# Patient Record
Sex: MALE | Race: WHITE | Employment: OTHER | URBAN - METROPOLITAN AREA
[De-identification: names, ages, dates, MRNs, and addresses within clinical notes are randomized per-mention and may not be internally consistent; named-entity substitution may affect disease eponyms.]

---

## 2021-03-06 ENCOUNTER — IMMUNIZATION (OUTPATIENT)
Dept: PRIMARY CARE CLINIC | Age: 76
End: 2021-03-06
Payer: MEDICARE

## 2021-03-06 PROCEDURE — 91300 COVID-19, PFIZER VACCINE 30MCG/0.3ML DOSE: CPT | Performed by: PHYSICIAN ASSISTANT

## 2021-03-06 PROCEDURE — 0001A COVID-19, PFIZER VACCINE 30MCG/0.3ML DOSE: CPT | Performed by: PHYSICIAN ASSISTANT

## 2021-04-01 ENCOUNTER — IMMUNIZATION (OUTPATIENT)
Dept: PRIMARY CARE CLINIC | Age: 76
End: 2021-04-01
Payer: MEDICARE

## 2021-04-01 PROCEDURE — 91300 COVID-19, PFIZER VACCINE 30MCG/0.3ML DOSE: CPT | Performed by: NURSE PRACTITIONER

## 2021-04-01 PROCEDURE — 0002A COVID-19, PFIZER VACCINE 30MCG/0.3ML DOSE: CPT | Performed by: NURSE PRACTITIONER

## 2021-10-06 ENCOUNTER — OFFICE VISIT (OUTPATIENT)
Dept: CARDIOLOGY CLINIC | Age: 76
End: 2021-10-06
Payer: MEDICARE

## 2021-10-06 VITALS
HEART RATE: 58 BPM | HEIGHT: 67 IN | WEIGHT: 225 LBS | SYSTOLIC BLOOD PRESSURE: 128 MMHG | DIASTOLIC BLOOD PRESSURE: 78 MMHG | RESPIRATION RATE: 12 BRPM | BODY MASS INDEX: 35.31 KG/M2

## 2021-10-06 DIAGNOSIS — R06.02 SOB (SHORTNESS OF BREATH): Primary | ICD-10-CM

## 2021-10-06 PROCEDURE — 93000 ELECTROCARDIOGRAM COMPLETE: CPT | Performed by: INTERNAL MEDICINE

## 2021-10-06 PROCEDURE — 99204 OFFICE O/P NEW MOD 45 MIN: CPT | Performed by: INTERNAL MEDICINE

## 2021-10-06 RX ORDER — AMLODIPINE BESYLATE 5 MG/1
TABLET ORAL
COMMUNITY
Start: 2020-11-03

## 2021-10-06 RX ORDER — ROPINIROLE 0.25 MG/1
0.25 TABLET, FILM COATED ORAL NIGHTLY
COMMUNITY

## 2021-10-06 RX ORDER — ALBUTEROL SULFATE 90 UG/1
AEROSOL, METERED RESPIRATORY (INHALATION)
COMMUNITY
Start: 2020-10-02

## 2021-10-06 NOTE — PROGRESS NOTES
OUTPATIENT CARDIOLOGY CONSULT    Name: Siena Serrano    Age: 70 y.o. Date of Service: 10/6/2021    Reason for Consultation: Shortness of breath    Referring Physician: Andressa Rayn DO    History of Present Illness:  Siena Serrano is a 70 y.o. male who presents today for further evaluation of shortness of breath. Previously evaluated by Dr. Rishi Yeh in inpatient consultation in 2015 for chest pain. Stress test and echocardiogram at that time were unremarkable. 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 His main complaint seems to be of restless legs which bothers him at night. He denies exertional claudication, denies exertional chest pain or shortness of breath. He reports \"chest congestion. \"  He smokes 1/2 pack cigarettes per day. Review of Systems:  Complete review of systems otherwise negative except as described above. Past Medical History:  Past Medical History:   Diagnosis Date    Emphysema lung ([address]     Hypertension     RLS (restless legs syndrome)        Past Surgical History:  Past Surgical History:   Procedure Laterality Date    CATARACT REMOVAL         Family History:  History reviewed. No pertinent family history.     Social History:  Social History     Tobacco Use    Smoking status: Current Every Day Smoker     Packs/day: 1.00     Types: Cigarettes    Smokeless tobacco: Never Used   Substance Use Topics    Alcohol use: No    Drug use: No        Allergies:  No Known Allergies    Current Medications:    Current Outpatient Medications:     amLODIPine (NORVASC) 5 MG tablet, TAKE ONE TABLET BY MOUTH EVERY DAY, Disp: , Rfl:     albuterol sulfate  (90 Base) MCG/ACT inhaler, INHALE 1 OR 2 PUFFS BY MOUTH FOUR TIMES A DAY AS NEEDED FOR SHORTNESS OF BREATH OR WHEEZING., Disp: , Rfl:     rOPINIRole (REQUIP) 0.25 MG tablet, Take 0.25 mg by mouth nightly Taking 3 times per week, Disp: , Rfl:     Multiple Vitamin (MULTI VITAMIN DAILY) TABS, Take by mouth daily, Disp: , Rfl:     pantoprazole (PROTONIX) 40 MG tablet, Take 1 tablet by mouth every morning (before breakfast) (Patient not taking: Reported on 10/6/2021), Disp: 30 tablet, Rfl: 3    Physical Exam:  /78   Pulse 58   Resp 12   Ht 5' 7\" (1.702 m)   Wt 225 lb (102.1 kg)   BMI 35.24 kg/m²   Wt Readings from Last 3 Encounters:   10/06/21 225 lb (102.1 kg)     Appearance: Overweight male, awake, alert, no acute respiratory distress  Skin: Intact, no rash  Eyes: EOMI, no conjunctival erythema  ENMT: Moist mucous membranes. Neck: Supple, no elevated JVP, no carotid bruits  Lungs: Scattered expiratory wheezing  Cardiac: Regular rhythm with a normal rate.   S1 & S2 normal, no murmurs  Abdomen: Soft, nontender, +bowel sounds  Extremities: Moves all extremities x 4, no lower extremity edema  Neurologic: No focal motor deficits apparent, normal mood and affect  Peripheral Pulses: Intact posterior tibial pulses bilaterally    Laboratory Tests:  Lab Results   Component Value Date    CREATININE 0.9 08/12/2015    BUN 18 08/12/2015     08/12/2015    K 4.2 08/12/2015    CL 99 08/12/2015    CO2 25 08/12/2015     No results found for: MG  Lab Results   Component Value Date    WBC 13.1 (H) 08/12/2015    HGB 15.6 08/12/2015    HCT 47.7 08/12/2015    MCV 88.7 08/12/2015     08/12/2015     No results found for: ALT, AST, GGT, ALKPHOS, BILITOT  Lab Results   Component Value Date    CKTOTAL 57 08/12/2015    CKMB 1.3 08/12/2015    TROPONINI <0.01 08/12/2015    TROPONINI <0.01 08/12/2015     Lab Results   Component Value Date    INR 1.0 08/12/2015    PROTIME 11.1 08/12/2015     No results found for: TSHFT4, TSH  No results found for: LABA1C  No results found for: EAG  Lab Results   Component Value Date    CHOL 178 08/12/2015     Lab Results   Component Value Date    TRIG 117 08/12/2015     Lab Results   Component Value Date    HDL 34 08/12/2015     Lab Results   Component Value Date LDLCALC 121 (H) 08/12/2015     Lab Results   Component Value Date    LABVLDL 23 08/12/2015     No results found for: CHOLHDLRATIO  No results for input(s): PROBNP in the last 72 hours. Blood work 9/14/2021  Total cholesterol 203, HDL 36, triglycerides 163,   BUN 19 creatinine 1.1  Sodium 137 potassium 4.1  AST ALT normal  Hemoglobin 15.5 platelets 996    Cardiac Tests:  ECG:   10/6/2021: Sinus bradycardia 58 bpm.  Normal axis. Nonspecific IVCD QRS duration 118 ms. Prior inferior infarct. Nonspecific T wave changes. Echocardiogram:   Transthoracic echo 8/2015   Summary   Stage I diastolic dysfunction   Ejection fraction is visually estimated at >56%. The left atrium is severely dilated. Structurally normal mitral valve. No aortic stenosis   No evidence of pericardial effusion. Stress test:    Pharmacologic stress 8/2015      Gated study shows normal left   ventricular wall motion and myocardial thickening during systole. Resting left ventricular ejection fraction 67%           Impression   IMPRESSION:  Normal examination.  In particular, there is no   evidence of left ventricular myocardium at risk for stress-induced   ischemia. Cardiac catheterization:     Orders Placed This Encounter   Procedures    EKG 12 lead    ECHO COMPLETE        Requested Prescriptions      No prescriptions requested or ordered in this encounter        ASSESSMENT / PLAN:  1. Dyspnea with exertion  2. Abnormal EKG, suggesting prior inferior infarct, similar to 2015. No infarct noted on stress testing  3. Hypertension, well controlled  4. Tobacco abuse ongoing  5. COPD/emphysema  6. Obesity, BMI 35.2 kg/m²  7.  Restless leg    Recommendations:    · Repeat 2D echocardiogram  · Does not seem to be having any exertional anginal symptoms, can hold off on stress testing for now  · Increase physical activity, weight loss recommended  · Aggressive risk factor modification including smoking cessation recommended  · Consider pulmonary evaluation for formal PFTs and optimization of inhaled therapies  · Consider sleep study  · Further recommendation pending review of echo    Thank you for allowing me to participate in your patient's care. Please feel free to contact me if you have any questions or concerns.       Araceli Jimenez MD, [facility] Card[facility]

## 2022-01-28 ENCOUNTER — TELEPHONE (OUTPATIENT)
Dept: CARDIOLOGY | Age: 77
End: 2022-01-28

## 2022-01-28 NOTE — TELEPHONE ENCOUNTER
CALLED PATIENT AND LEFT MESSAGE TO SCHEDULE ECHO.     Electronically signed by Emani Wahl on 1/28/2022 at 12:17 PM

## 2022-05-04 ENCOUNTER — HOSPITAL ENCOUNTER (OUTPATIENT)
Dept: CT IMAGING | Age: 77
Discharge: HOME | End: 2022-05-06
Payer: MEDICARE

## 2022-05-04 DIAGNOSIS — R06.02 SHORTNESS OF BREATH: ICD-10-CM

## 2022-05-04 PROCEDURE — 71250 CT THORAX DX C-: CPT

## 2024-03-21 ENCOUNTER — TELEPHONE (OUTPATIENT)
Dept: ENT CLINIC | Age: 79
End: 2024-03-21

## 2024-03-21 NOTE — TELEPHONE ENCOUNTER
b/l sensorineural hearing loss     [facility] patient [facility] referral. Patient requesting to be seen sooner than July with Dr Poncho vasques or deng April or beginning May . Please advise patient [phone]

## 2024-03-25 NOTE — TELEPHONE ENCOUNTER
Patient called back in regards to current symptoms patient went to [facility], has hearing loss [facility] test was [facility] may have impacted cerumen.

## 2024-03-27 ENCOUNTER — APPOINTMENT (OUTPATIENT)
Dept: ULTRASOUND IMAGING | Age: 79
End: 2024-03-27
Payer: OTHER GOVERNMENT

## 2024-03-27 ENCOUNTER — HOSPITAL ENCOUNTER (EMERGENCY)
Age: 79
Discharge: HOME | End: 2024-03-27
Payer: OTHER GOVERNMENT

## 2024-03-27 ENCOUNTER — APPOINTMENT (OUTPATIENT)
Dept: GENERAL RADIOLOGY | Age: 79
End: 2024-03-27
Payer: OTHER GOVERNMENT

## 2024-03-27 VITALS
HEART RATE: 64 BPM | HEIGHT: 67 IN | OXYGEN SATURATION: 95 % | SYSTOLIC BLOOD PRESSURE: 100 MMHG | WEIGHT: 216 LBS | RESPIRATION RATE: 16 BRPM | TEMPERATURE: 97.9 F | BODY MASS INDEX: 33.9 KG/M2 | DIASTOLIC BLOOD PRESSURE: 83 MMHG

## 2024-03-27 DIAGNOSIS — L03.032 CELLULITIS OF TOE OF LEFT FOOT: ICD-10-CM

## 2024-03-27 DIAGNOSIS — L03.032 PARONYCHIA OF GREAT TOE OF LEFT FOOT: Primary | ICD-10-CM

## 2024-03-27 PROCEDURE — 96372 THER/PROPH/DIAG INJ SC/IM: CPT

## 2024-03-27 PROCEDURE — 6370000000 HC RX 637 (ALT 250 FOR IP)

## 2024-03-27 PROCEDURE — 6360000002 HC RX W HCPCS

## 2024-03-27 PROCEDURE — 73610 X-RAY EXAM OF ANKLE: CPT

## 2024-03-27 PROCEDURE — 73630 X-RAY EXAM OF FOOT: CPT

## 2024-03-27 PROCEDURE — 99284 EMERGENCY DEPT VISIT MOD MDM: CPT

## 2024-03-27 PROCEDURE — 93971 EXTREMITY STUDY: CPT

## 2024-03-27 PROCEDURE — 10060 I&D ABSCESS SIMPLE/SINGLE: CPT

## 2024-03-27 RX ORDER — IBUPROFEN 600 MG/1
600 TABLET ORAL 3 TIMES DAILY PRN
Qty: 21 TABLET | Refills: 0 | Status: SHIPPED | OUTPATIENT
Start: 2024-03-27 | End: 2024-04-03

## 2024-03-27 RX ORDER — BACITRACIN ZINC AND POLYMYXIN B SULFATE 500; 1000 [USP'U]/G; [USP'U]/G
OINTMENT TOPICAL
Qty: 28.4 G | Refills: 1 | Status: SHIPPED | OUTPATIENT
Start: 2024-03-27 | End: 2024-04-03

## 2024-03-27 RX ORDER — CEPHALEXIN 500 MG/1
500 CAPSULE ORAL ONCE
Status: DISCONTINUED | OUTPATIENT
Start: 2024-03-27 | End: 2024-03-28 | Stop reason: HOSPADM

## 2024-03-27 RX ORDER — KETOROLAC TROMETHAMINE 30 MG/ML
30 INJECTION, SOLUTION INTRAMUSCULAR; INTRAVENOUS ONCE
Status: COMPLETED | OUTPATIENT
Start: 2024-03-27 | End: 2024-03-27

## 2024-03-27 RX ORDER — CEPHALEXIN 500 MG/1
500 CAPSULE ORAL 4 TIMES DAILY
Qty: 28 CAPSULE | Refills: 0 | Status: SHIPPED | OUTPATIENT
Start: 2024-03-27 | End: 2024-04-03

## 2024-03-27 RX ORDER — ETHYL CHLORIDE 100 %
AEROSOL, SPRAY (ML) TOPICAL ONCE
Status: COMPLETED | OUTPATIENT
Start: 2024-03-27 | End: 2024-03-27

## 2024-03-27 RX ORDER — GINSENG 100 MG
CAPSULE ORAL ONCE
Status: COMPLETED | OUTPATIENT
Start: 2024-03-27 | End: 2024-03-27

## 2024-03-27 RX ADMIN — KETOROLAC TROMETHAMINE 30 MG: 30 INJECTION, SOLUTION INTRAMUSCULAR at 22:11

## 2024-03-27 RX ADMIN — BACITRACIN: 500 OINTMENT TOPICAL at 22:11

## 2024-03-27 RX ADMIN — Medication: at 21:57

## 2024-03-27 ASSESSMENT — PAIN - FUNCTIONAL ASSESSMENT: PAIN_FUNCTIONAL_ASSESSMENT: 0-10

## 2024-03-27 ASSESSMENT — LIFESTYLE VARIABLES
HOW OFTEN DO YOU HAVE A DRINK CONTAINING ALCOHOL: NEVER
HOW MANY STANDARD DRINKS CONTAINING ALCOHOL DO YOU HAVE ON A TYPICAL DAY: PATIENT DOES NOT DRINK

## 2024-03-27 ASSESSMENT — PAIN SCALES - GENERAL: PAINLEVEL_OUTOF10: 6

## 2024-03-27 NOTE — ED TRIAGE NOTES
Department of Emergency Medicine    FIRST PROVIDER TRIAGE NOTE             Independent MLP           3/27/24  5:42 PM EDT    Date of Encounter: 3/27/24   MRN: [id number]    Vitals:    03/27/24 1726   BP: 100/83   Pulse: 64   Resp: 16   Temp: 97.9 °F (36.6 °C)   SpO2: 95%   Weight: 98 kg (216 lb)   Height: 1.702 m (5' 7\")        HPI: Edinson Ricketts is a 78 y.o. male who presents to the ED for Joint Swelling (Left foot and ankle swelling, warm and red x4 days)     ROS: Negative for cp, sob, or fever.    Physical Exam:   Gen Appearance/Constitutional: alert  CV: regular rate     Initial Plan of Care: All treatment areas with department are currently occupied.     Plan to order/Initiate the following while awaiting opening in ED: Triage evaluation  imaging studies.    Initial Plan of Care: Initiate Treatment-Testing, Proceed toTreatment Area When Bed Available for ED Attending/MLP to Continue Care    Electronically signed by Graciela Marion PA-C   DD: 3/27/24

## 2024-03-28 NOTE — DISCHARGE INSTRUCTIONS
Vascular duplex lower extremity venous left   Final Result   No evidence of DVT in the left lower extremity.         XR FOOT LEFT (MIN 3 VIEWS)   Final Result   No acute osseous abnormality.      There is diffuse soft tissue swelling.         XR ANKLE LEFT (MIN 3 VIEWS)   Final Result   No acute abnormality of the ankle.      Diffuse soft tissue swelling.         Please call Dr. Vázquez and schedule an appointment for follow-up. There was some pus that came out of the skin beneath your toenail on your big toe (left foot). I am going to treat you for an infection in your big toe called paronychia. You need to take all doses of your antibiotic as prescribed. Do not miss any doses! Also apply antibiotic ointment 2 times per day to this toe. Please keep this toe clean and dry. It may also help to soak your foot in warm, soapy water.  If you develop worsening pain or infection looks worse, you need to return to the emergency department for reevaluation. If you develop fevers, body aches, or chills, you should also return to the emergency department.

## 2024-03-28 NOTE — ED PROVIDER NOTES
fracture, dislocation, gout, dependent edema    Patient is a 78 presents emergency department complaining of nontraumatic left great toe pain.  He is also having some swelling to left ankle.  There is swelling and erythema noted to left great toe, as well as fluctuance noted below base of toenail.  I am highly suspicious for paronychia.  Incision and drainage was performed, small amount of pus and blood was drained from paronychia.  X-ray of left foot and ankle shows no acute abnormality, does show diffuse soft tissue swelling.  Ultrasound of left lower extremity shows no evidence for DVT.  Vascular duplex lower extremity venous left   Final Result   No evidence of DVT in the left lower extremity.         XR FOOT LEFT (MIN 3 VIEWS)   Final Result   No acute osseous abnormality.      There is diffuse soft tissue swelling.         XR ANKLE LEFT (MIN 3 VIEWS)   Final Result   No acute abnormality of the ankle.      Diffuse soft tissue swelling.         Toradol was given while in the emergency department for pain control.  Bacitracin ointment was applied to paronychia after I&D was performed.  Keflex, ibuprofen, Polysporin was sent to patient's pharmacy for pickup.  He is instructed to take entire course of Keflex, not to miss any doses.  Instructed to apply Polysporin ointment to affected digit 2 times daily.  Instructed to keep area clean and dry.  I specifically explained to patient that if he notices any worsening pain, worsening swelling and redness, drainage, fevers, he needs to return to the emergency department for reevaluation.  I instructed patient to call his primary care doctor tomorrow morning to schedule appointment for follow-up visit to ensure improvement of paronychia.  Patient understands all discharge education and is agreeable with plan.  All questions and concerns addressed prior to discharge.  Patient stable for discharge home.    Plan of Care/Counseling:  MIKE Dalton - JEROME reviewed

## 2024-04-10 ENCOUNTER — OFFICE VISIT (OUTPATIENT)
Dept: ENT CLINIC | Age: 79
End: 2024-04-10
Payer: MEDICARE

## 2024-04-10 VITALS
DIASTOLIC BLOOD PRESSURE: 72 MMHG | HEART RATE: 63 BPM | BODY MASS INDEX: 34.21 KG/M2 | HEIGHT: 67 IN | OXYGEN SATURATION: 93 % | WEIGHT: 218 LBS | SYSTOLIC BLOOD PRESSURE: 131 MMHG

## 2024-04-10 DIAGNOSIS — H61.23 BILATERAL IMPACTED CERUMEN: Primary | ICD-10-CM

## 2024-04-10 PROCEDURE — 99203 OFFICE O/P NEW LOW 30 MIN: CPT | Performed by: OTOLARYNGOLOGY

## 2024-04-10 PROCEDURE — 1123F ACP DISCUSS/DSCN MKR DOCD: CPT | Performed by: OTOLARYNGOLOGY

## 2024-04-10 PROCEDURE — 69210 REMOVE IMPACTED EAR WAX UNI: CPT | Performed by: OTOLARYNGOLOGY

## 2024-04-10 RX ORDER — DOXYCYCLINE HYCLATE 100 MG/1
100 CAPSULE ORAL 2 TIMES DAILY
COMMUNITY
Start: 2024-04-03

## 2024-04-10 NOTE — PROGRESS NOTES
HENT:      Head: Normocephalic and atraumatic.      Right Ear: Ear canal and external ear normal. There is impacted cerumen.      Left Ear: Ear canal and external ear normal. There is impacted cerumen.      Nose: Nose normal.      Mouth/Throat:      Pharynx: Uvula midline.   Eyes:      Conjunctiva/sclera: Conjunctivae normal.      Pupils: Pupils are equal, round, and reactive to light.   Cardiovascular:      Rate and Rhythm: Normal rate and regular rhythm.      Heart sounds: Normal heart sounds.   Pulmonary:      Effort: Pulmonary effort is normal.      Breath sounds: Normal breath sounds.   Abdominal:      General: Bowel sounds are normal.      Palpations: Abdomen is soft.   Musculoskeletal:      Cervical back: Normal range of motion and neck supple.   Skin:     General: Skin is warm and dry.   Neurological:      Mental Status: He is alert and oriented to person, place, and time.               Cerumenremoval     Auditory canal(s) both ears partially obstructed with cerumen.  A microscope wasnot used . Cerumen was gently removed using soft plastic curette, suction. Tympanic membranes are intact following the procedure.Auditory canals appear normal.                       Assessment:       Diagnosis Orders   1. Bilateral impacted cerumen [H61.23]                   Plan:      Cerumen impaction   Will follow up with patient in 6 months unless the patient has further issues. Discussed H2O2 andirrigation bi-weekly for maintenance      Follow up as scheduled                      Edinson Ricketts  1945    I have discussed the case, including pertinent history and exam findings with the resident. I have seen and examined the patient and the key elements of the encounter have been performed by me. I agree with the assessment, plan and orders as documented by the  resident              Remainder of medical problems as per  resident note.    Patient seen and examined. Agree with above exam, assessment and

## 2024-05-06 ASSESSMENT — ENCOUNTER SYMPTOMS
CHEST TIGHTNESS: 0
RESPIRATORY NEGATIVE: 1
EYES NEGATIVE: 1
EYE PAIN: 0
VOMITING: 0
SHORTNESS OF BREATH: 0
ABDOMINAL PAIN: 0
EYE DISCHARGE: 0
GASTROINTESTINAL NEGATIVE: 1
DIARRHEA: 0
COLOR CHANGE: 0
APNEA: 0